# Patient Record
Sex: MALE | Race: WHITE | Employment: FULL TIME | URBAN - METROPOLITAN AREA
[De-identification: names, ages, dates, MRNs, and addresses within clinical notes are randomized per-mention and may not be internally consistent; named-entity substitution may affect disease eponyms.]

---

## 2019-02-07 ENCOUNTER — ANESTHESIA EVENT (OUTPATIENT)
Dept: SURGERY | Age: 59
End: 2019-02-07
Payer: COMMERCIAL

## 2019-02-08 ENCOUNTER — ANESTHESIA (OUTPATIENT)
Dept: SURGERY | Age: 59
End: 2019-02-08
Payer: COMMERCIAL

## 2019-02-08 ENCOUNTER — HOSPITAL ENCOUNTER (OUTPATIENT)
Age: 59
Discharge: HOME | End: 2019-02-08
Attending: PODIATRIST | Admitting: PODIATRIST
Payer: COMMERCIAL

## 2019-02-08 VITALS
BODY MASS INDEX: 21.76 KG/M2 | DIASTOLIC BLOOD PRESSURE: 90 MMHG | HEART RATE: 70 BPM | OXYGEN SATURATION: 94 % | RESPIRATION RATE: 15 BRPM | TEMPERATURE: 97.5 F | WEIGHT: 156 LBS | SYSTOLIC BLOOD PRESSURE: 141 MMHG

## 2019-02-08 PROBLEM — D48.5 NEOPLASM OF UNCERTAIN BEHAVIOR OF SKIN: Status: ACTIVE | Noted: 2019-02-08

## 2019-02-08 LAB
ALBUMIN SERPL-MCNC: 3.7 G/DL (ref 3.5–5)
ALBUMIN/GLOB SERPL: 1.2 {RATIO} (ref 1.2–3.5)
ALP SERPL-CCNC: 85 U/L (ref 50–136)
ALT SERPL-CCNC: 22 U/L (ref 12–65)
ANION GAP SERPL CALC-SCNC: 8 MMOL/L (ref 7–16)
AST SERPL-CCNC: 22 U/L (ref 15–37)
BILIRUB SERPL-MCNC: 0.4 MG/DL (ref 0.2–1.1)
BUN SERPL-MCNC: 6 MG/DL (ref 6–23)
CALCIUM SERPL-MCNC: 8.9 MG/DL (ref 8.3–10.4)
CHLORIDE SERPL-SCNC: 103 MMOL/L (ref 98–107)
CO2 SERPL-SCNC: 26 MMOL/L (ref 21–32)
CREAT SERPL-MCNC: 0.64 MG/DL (ref 0.8–1.5)
ERYTHROCYTE [DISTWIDTH] IN BLOOD BY AUTOMATED COUNT: 14.2 % (ref 11.9–14.6)
GLOBULIN SER CALC-MCNC: 3.1 G/DL (ref 2.3–3.5)
GLUCOSE SERPL-MCNC: 93 MG/DL (ref 65–100)
HCT VFR BLD AUTO: 39.7 % (ref 41.1–50.3)
HGB BLD-MCNC: 14.1 G/DL (ref 13.6–17.2)
MCH RBC QN AUTO: 36.9 PG (ref 26.1–32.9)
MCHC RBC AUTO-ENTMCNC: 35.5 G/DL (ref 31.4–35)
MCV RBC AUTO: 103.9 FL (ref 79.6–97.8)
NRBC # BLD: 0 K/UL (ref 0–0.2)
PLATELET # BLD AUTO: 239 K/UL (ref 150–450)
PMV BLD AUTO: 8.6 FL (ref 9.4–12.3)
POTASSIUM SERPL-SCNC: 4.2 MMOL/L (ref 3.5–5.1)
PROT SERPL-MCNC: 6.8 G/DL (ref 6.3–8.2)
RBC # BLD AUTO: 3.82 M/UL (ref 4.23–5.6)
SODIUM SERPL-SCNC: 137 MMOL/L (ref 136–145)
WBC # BLD AUTO: 5.2 K/UL (ref 4.3–11.1)

## 2019-02-08 PROCEDURE — 74011250636 HC RX REV CODE- 250/636: Performed by: ANESTHESIOLOGY

## 2019-02-08 PROCEDURE — 77030000032 HC CUF TRNQT ZIMM -B: Performed by: PODIATRIST

## 2019-02-08 PROCEDURE — 74011250636 HC RX REV CODE- 250/636

## 2019-02-08 PROCEDURE — 76210000020 HC REC RM PH II FIRST 0.5 HR: Performed by: PODIATRIST

## 2019-02-08 PROCEDURE — 76010000138 HC OR TIME 0.5 TO 1 HR: Performed by: PODIATRIST

## 2019-02-08 PROCEDURE — 74011250636 HC RX REV CODE- 250/636: Performed by: PODIATRIST

## 2019-02-08 PROCEDURE — 76060000032 HC ANESTHESIA 0.5 TO 1 HR: Performed by: PODIATRIST

## 2019-02-08 PROCEDURE — 77030018836 HC SOL IRR NACL ICUM -A: Performed by: PODIATRIST

## 2019-02-08 PROCEDURE — 74011000250 HC RX REV CODE- 250: Performed by: PODIATRIST

## 2019-02-08 PROCEDURE — 88305 TISSUE EXAM BY PATHOLOGIST: CPT

## 2019-02-08 PROCEDURE — 80053 COMPREHEN METABOLIC PANEL: CPT

## 2019-02-08 PROCEDURE — 76210000063 HC OR PH I REC FIRST 0.5 HR: Performed by: PODIATRIST

## 2019-02-08 PROCEDURE — 85027 COMPLETE CBC AUTOMATED: CPT

## 2019-02-08 PROCEDURE — 77030002916 HC SUT ETHLN J&J -A: Performed by: PODIATRIST

## 2019-02-08 PROCEDURE — 77030031139 HC SUT VCRL2 J&J -A: Performed by: PODIATRIST

## 2019-02-08 RX ORDER — PROMETHAZINE HYDROCHLORIDE 25 MG/1
25 TABLET ORAL
Qty: 30 TAB | Refills: 0 | Status: SHIPPED | OUTPATIENT
Start: 2019-02-08

## 2019-02-08 RX ORDER — LIDOCAINE HYDROCHLORIDE 10 MG/ML
0.3 INJECTION INFILTRATION; PERINEURAL ONCE
Status: DISCONTINUED | OUTPATIENT
Start: 2019-02-08 | End: 2019-02-08 | Stop reason: HOSPADM

## 2019-02-08 RX ORDER — DEXAMETHASONE SODIUM PHOSPHATE 100 MG/10ML
INJECTION INTRAMUSCULAR; INTRAVENOUS AS NEEDED
Status: DISCONTINUED | OUTPATIENT
Start: 2019-02-08 | End: 2019-02-08 | Stop reason: HOSPADM

## 2019-02-08 RX ORDER — SODIUM CHLORIDE, SODIUM LACTATE, POTASSIUM CHLORIDE, CALCIUM CHLORIDE 600; 310; 30; 20 MG/100ML; MG/100ML; MG/100ML; MG/100ML
100 INJECTION, SOLUTION INTRAVENOUS CONTINUOUS
Status: DISCONTINUED | OUTPATIENT
Start: 2019-02-08 | End: 2019-02-08 | Stop reason: HOSPADM

## 2019-02-08 RX ORDER — CEFAZOLIN SODIUM/WATER 2 G/20 ML
2 SYRINGE (ML) INTRAVENOUS ONCE
Status: COMPLETED | OUTPATIENT
Start: 2019-02-08 | End: 2019-02-08

## 2019-02-08 RX ORDER — BUPIVACAINE HYDROCHLORIDE 5 MG/ML
INJECTION, SOLUTION EPIDURAL; INTRACAUDAL AS NEEDED
Status: DISCONTINUED | OUTPATIENT
Start: 2019-02-08 | End: 2019-02-08 | Stop reason: HOSPADM

## 2019-02-08 RX ORDER — PROPOFOL 10 MG/ML
INJECTION, EMULSION INTRAVENOUS
Status: DISCONTINUED | OUTPATIENT
Start: 2019-02-08 | End: 2019-02-08 | Stop reason: HOSPADM

## 2019-02-08 RX ORDER — MIDAZOLAM HYDROCHLORIDE 1 MG/ML
2 INJECTION, SOLUTION INTRAMUSCULAR; INTRAVENOUS
Status: DISCONTINUED | OUTPATIENT
Start: 2019-02-08 | End: 2019-02-08 | Stop reason: HOSPADM

## 2019-02-08 RX ORDER — MIDAZOLAM HYDROCHLORIDE 1 MG/ML
INJECTION, SOLUTION INTRAMUSCULAR; INTRAVENOUS AS NEEDED
Status: DISCONTINUED | OUTPATIENT
Start: 2019-02-08 | End: 2019-02-08 | Stop reason: HOSPADM

## 2019-02-08 RX ORDER — SODIUM CHLORIDE 0.9 % (FLUSH) 0.9 %
5-40 SYRINGE (ML) INJECTION EVERY 8 HOURS
Status: DISCONTINUED | OUTPATIENT
Start: 2019-02-08 | End: 2019-02-08 | Stop reason: HOSPADM

## 2019-02-08 RX ORDER — HYDROMORPHONE HYDROCHLORIDE 2 MG/ML
0.5 INJECTION, SOLUTION INTRAMUSCULAR; INTRAVENOUS; SUBCUTANEOUS
Status: DISCONTINUED | OUTPATIENT
Start: 2019-02-08 | End: 2019-02-08 | Stop reason: HOSPADM

## 2019-02-08 RX ORDER — OXYCODONE HYDROCHLORIDE 5 MG/1
10 TABLET ORAL
Status: DISCONTINUED | OUTPATIENT
Start: 2019-02-08 | End: 2019-02-08 | Stop reason: HOSPADM

## 2019-02-08 RX ORDER — PROPOFOL 10 MG/ML
INJECTION, EMULSION INTRAVENOUS AS NEEDED
Status: DISCONTINUED | OUTPATIENT
Start: 2019-02-08 | End: 2019-02-08 | Stop reason: HOSPADM

## 2019-02-08 RX ORDER — SODIUM CHLORIDE 0.9 % (FLUSH) 0.9 %
5-40 SYRINGE (ML) INJECTION AS NEEDED
Status: DISCONTINUED | OUTPATIENT
Start: 2019-02-08 | End: 2019-02-08 | Stop reason: HOSPADM

## 2019-02-08 RX ORDER — LIDOCAINE HYDROCHLORIDE 20 MG/ML
INJECTION, SOLUTION EPIDURAL; INFILTRATION; INTRACAUDAL; PERINEURAL AS NEEDED
Status: DISCONTINUED | OUTPATIENT
Start: 2019-02-08 | End: 2019-02-08 | Stop reason: HOSPADM

## 2019-02-08 RX ORDER — LIDOCAINE HYDROCHLORIDE 10 MG/ML
INJECTION INFILTRATION; PERINEURAL AS NEEDED
Status: DISCONTINUED | OUTPATIENT
Start: 2019-02-08 | End: 2019-02-08 | Stop reason: HOSPADM

## 2019-02-08 RX ADMIN — LIDOCAINE HYDROCHLORIDE 70 MG: 20 INJECTION, SOLUTION EPIDURAL; INFILTRATION; INTRACAUDAL; PERINEURAL at 07:13

## 2019-02-08 RX ADMIN — Medication 2 G: at 07:13

## 2019-02-08 RX ADMIN — MIDAZOLAM HYDROCHLORIDE 2 MG: 1 INJECTION, SOLUTION INTRAMUSCULAR; INTRAVENOUS at 07:13

## 2019-02-08 RX ADMIN — SODIUM CHLORIDE, SODIUM LACTATE, POTASSIUM CHLORIDE, AND CALCIUM CHLORIDE: 600; 310; 30; 20 INJECTION, SOLUTION INTRAVENOUS at 07:13

## 2019-02-08 RX ADMIN — PROPOFOL 100 MCG/KG/MIN: 10 INJECTION, EMULSION INTRAVENOUS at 07:13

## 2019-02-08 RX ADMIN — PROPOFOL 50 MG: 10 INJECTION, EMULSION INTRAVENOUS at 07:13

## 2019-02-08 RX ADMIN — SODIUM CHLORIDE, SODIUM LACTATE, POTASSIUM CHLORIDE, AND CALCIUM CHLORIDE 100 ML/HR: 600; 310; 30; 20 INJECTION, SOLUTION INTRAVENOUS at 06:09

## 2019-02-08 NOTE — BRIEF OP NOTE
BRIEF OPERATIVE NOTE Date of Procedure: 2/8/2019 Preoperative Diagnosis: Neoplasm of uncertain behavior of skin of lower extremity [D48.5] Left foot pain [M79.672] Postoperative Diagnosis: Neoplasm of uncertain behavior of skin of lower extremity [D48.5] Left foot pain [M79.672] Procedure(s): EXCISION OF NEUROFIBROMA LEFT FOOT Surgeon(s) and Role: * Latoya Chen DPM - Primary Surgical Assistant:  
 
Surgical Staff: 
Circ-1: Snow Gutierrez RN Scrub Tech-1: Bonnie Ford Event Time In Time Out Incision Start 0730 Incision Close 1236 Anesthesia: MAC Estimated Blood Loss:  
Specimens:  
ID Type Source Tests Collected by Time Destination 1 : LEFT FOOT NEUROFIBROMA Preservative Foot, left  [place], [place] 2/8/2019 7040 Pathology Findings: dictated Complications: none Implants: * No implants in log *

## 2019-02-08 NOTE — ANESTHESIA PREPROCEDURE EVALUATION
Anesthetic History Review of Systems / Medical History Patient summary reviewed and pertinent labs reviewed Pulmonary Within defined limits Neuro/Psych Within defined limits Cardiovascular Hypertension Exercise tolerance: >4 METS 
  
GI/Hepatic/Renal 
Within defined limits Comments: H/o kidney stones Endo/Other Within defined limits Other Findings Physical Exam 
 
Airway Mallampati: II 
TM Distance: 4 - 6 cm Neck ROM: normal range of motion Mouth opening: Normal 
 
 Cardiovascular Rhythm: regular Rate: normal 
 
 
 
 Dental 
 
Dentition: Poor dentition Pulmonary Breath sounds clear to auscultation Abdominal 
 
 
 
 Other Findings Anesthetic Plan ASA: 2 Anesthesia type: total IV anesthesia Induction: Intravenous Anesthetic plan and risks discussed with: Patient and Spouse

## 2019-02-08 NOTE — H&P
Patient: Minor Ngo MRN: [id number]  SSN: [id number] YOB: 1960  Age: 66 y.o. Sex: male History and Physical 
 
Minor Ngo is a 66 y.o. male having Procedure(s): EXCISION OF NEUROFIBROMA LEFT FOOT. Allergies: No Known Allergies Chief Complaint: uncomfortable foot 8-10 months History of Present Illness: please see podiatry HPI Past Medical History:  
Diagnosis Date  Cancer ([address] 2011  
 basal-- left shoulder-- removed  History of kidney stones last one 2009  
 x 2  
 Hypertension   
 controlled with med  Left foot pain Past Surgical History:  
Procedure Laterality Date  HX KNEE ARTHROSCOPY Right 1980's  HX LITHOTRIPSY  2009  HX OTHER SURGICAL Left 2011  
 basal cell cancer removed from shoulder Family History Problem Relation Age of Onset  Cancer Mother  Heart Disease Father  Cancer Father Social History Tobacco Use  Smoking status: Current Every Day Smoker Packs/day: 0.25 Years: 18.00 Pack years: 4.50  Smokeless tobacco: Current User Substance Use Topics  Alcohol use: Yes Alcohol/week: 6.0 oz Types: 10 Glasses of wine per week Prior to Admission medications Medication Sig Start Date End Date Taking? Authorizing Provider  
lisinopril (PRINIVIL, ZESTRIL) 10 mg tablet Take 10 mg by mouth daily. Provider, Historical  
aspirin/salicylamide/caffeine (BC HEADACHE POWDER PO) Take  by mouth daily as needed. Stopped 2/8/19    Provider, Historical  
  
 
Visit Vitals /87 (BP 1 Location: Left arm, BP Patient Position: Sitting) Pulse 85 Temp 36.7 °C (98 °F) Resp 18 Wt 70.8 kg (156 lb) SpO2 95% BMI 21.76 kg/m² Assessment and Plan: Minor Ngo is a 66 y.o. male having Procedure(s): EXCISION OF NEUROFIBROMA LEFT FOOT. Preanesthesia Evaluation Last edited 02/08/19 0670 by Con Matos MD 
Date of Service 02/08/19 9938 Anesthetic History Review of Systems / Medical History Patient summary reviewed and pertinent labs reviewed Pulmonary Within defined limits Neuro/Psych Within defined limits Cardiovascular Hypertension Exercise tolerance: >4 METS 
  
GI/Hepatic/Renal 
Within defined limits Comments: H/o kidney stones Endo/Other Within defined limits Other Findings Physical Exam 
 
Airway Mallampati: II 
TM Distance: 4 - 6 cm Neck ROM: normal range of motion Mouth opening: Normal 
 
 Cardiovascular Rhythm: regular Rate: normal 
 
 
 
 Dental 
 
Dentition: Poor dentition Pulmonary Breath sounds clear to auscultation Abdominal 
 
 
 
 Other Findings Anesthetic Plan ASA: 2 Anesthesia type: total IV anesthesia Induction: Intravenous Anesthetic plan and risks discussed with: Patient and Spouse Preanesthesia evaluation performed by Justine Oropeza MD  
  
 
 
 
Signed By: Brianna Wylie MD   
 February 8, 2019

## 2019-02-08 NOTE — DISCHARGE INSTRUCTIONS
[facility]    Post operative instructions and recommendations for your personal comfort following foot surgery:    1. Upon arrival home, lie down and elevate your feet and legs approximately 16 on pillows. Also, support your knees with pillows. 2. Post-operative swelling is greatly reduced by the use of ice packs. Ice packs should be applied over the top or bottom of the bandage every twenty minutes on the hour until returning to the office. 3. Take medication prescribed by the doctor according to directions. Avoid taking medication on an empty stomach. 4. Remain quiet and off of your feet as much as possible for the first 48 hours. After this period use discretion and common sense regarding the amount of standing or walking you do. Generally, post-operative patients should stay off their feet for 24-48hours. After this period walking to tolerance is usually allowed (unless otherwise specified by your doctor). 5. Do not be alarmed if there is some slight bleeding on the bandages; good blood supply is essential for normal tissue healing. 6. Keep feet elevated as much as possible for the first three days. Generally above the chest is most desirable. 7. Keep bandages completely dry. 8. Do not remove the surgical bandages. 9. The successful result of your surgery depends on the careful following of these instructions. 10. Please refrain from all alcoholic beverages. 11. If there are any questions or problems, please feel free to phone the doctor at the office.         Devin Dubois, [address] [place] [address] [phone]  [address] [facility] [phone]    Dr. Giselle Boyer  Cell RKLAXN--[phone]    After general anesthesia or intravenous sedation, for 24 hours or while taking prescription Narcotics:  · Limit your activities  · Do not drive and operate hazardous machinery  · Do not make important personal or business decisions  · Do  not drink alcoholic beverages  · If you have not urinated within 8 hours after discharge, please contact your surgeon on call. *  Please give a list of your current medications to your Primary Care Provider. *  Please update this list whenever your medications are discontinued, doses are      changed, or new medications (including over-the-counter products) are added. *  Please carry medication information at all times in case of emergency situations. These are general instructions for a healthy lifestyle:  No smoking/ No tobacco products/ Avoid exposure to second hand smoke  Surgeon General's Warning:  Quitting smoking now greatly reduces serious risk to your health. Obesity, smoking, and sedentary lifestyle greatly increases your risk for illness  A healthy diet, regular physical exercise & weight monitoring are important for maintaining a healthy lifestyle    You may be retaining fluid if you have a history of heart failure or if you experience any of the following symptoms:  Weight gain of 3 pounds or more overnight or 5 pounds in a week, increased swelling in our hands or feet or shortness of breath while lying flat in bed. Please call your doctor as soon as you notice any of these symptoms; do not wait until your next office visit. Recognize signs and symptoms of STROKE:  F-face looks uneven  A-arms unable to move or move unevenly  S-speech slurred or non-existent  T-time-call 911 as soon as signs and symptoms begin-DO NOT go       Back to bed or wait to see if you get better-TIME IS BRAIN.

## 2019-02-08 NOTE — ANESTHESIA POSTPROCEDURE EVALUATION
Procedure(s): EXCISION OF NEUROFIBROMA LEFT FOOT. Anesthesia Post Evaluation Multimodal analgesia: multimodal analgesia used between 6 hours prior to anesthesia start to PACU discharge Patient location during evaluation: PACU Patient participation: complete - patient participated Level of consciousness: awake and alert Pain management: adequate Airway patency: patent Anesthetic complications: no 
Cardiovascular status: acceptable Respiratory status: acceptable Hydration status: acceptable Post anesthesia nausea and vomiting:  none Visit Vitals /90 (BP 1 Location: Right arm, BP Patient Position: At rest) Pulse 70 Temp 36.4 °C (97.5 °F) Resp 15 Wt 70.8 kg (156 lb) SpO2 94% BMI 21.76 kg/m²

## 2019-02-08 NOTE — OP NOTES
[facility]  OPERATIVE REPORT    Name:  Kristian Scott  MR#:  [id number]  :  1960  ACCOUNT #:  [de-identified]  DATE OF SERVICE:  2019    PREOPERATIVE DIAGNOSIS:  Soft tissue mass on the plantar aspect in the first  metatarsophalangeal joint area proximal to the weightbearing surface of the left  foot. POSTOPERATIVE DIAGNOSIS:  Soft tissue mass on the plantar aspect in the first  metatarsophalangeal joint area proximal to the weightbearing surface of the left  foot. PROCEDURE PERFORMED:  Excision of the lesion with a plastic closure. SURGEON:  Dc Chen DPM.    ASSISTANT:  _____    ANESTHESIA:  IV sedation with local infiltration. COMPLICATIONS:  _____. SPECIMENS REMOVED:  _____. IMPLANTS:  _____. ESTIMATED BLOOD LOSS:  _____. PROCEDURE IN DETAIL:  The patient was brought to the operating room and placed on  operating table in supine position. After a brief general anesthesia, local  anesthesia was achieved utilizing 1:1 mixture of 0.5% Marcaine and 1% Xylocaine,  utilizing a total of approximately 9-10 mL. At this time, the left foot was prepped  and draped in the usual standard fashion. The foot was exsanguinated and tourniquet  was elevated at the ankle level at 250 mmHg. Attention was then directed to the  plantar aspect of the left foot, where the lesion was clearly visible. It was  somewhat pedunculated. At this time, an incision was made. The entire area was  ellipsed. The entire lesion was removed from the surgical site in toto including the  overlying skin. The surgical site was irrigated. At this time, the skin was freed  from the surrounding soft tissues to be able to achieve a closure. After mobilizing  the skin, skin was easily coaptable. The entire wound was closed with multiple  simple interrupted and horizontal and vertical mattress sutures.   Postoperatively,  the surgical area was injected with 4 mL of 0.5% Marcaine mixed with 4 mg of  dexamethasone. As soon as the tourniquet was released, instant warming and pinking  of the digits were noted indicating good return of the vascular supply. The wound  was dressed with Betadine soaked Adaptic, plain 4 x 4 Kerlix, and Coban. The patient  tolerated the anesthesia and procedure well and left the operating room in very  satisfactory condition. Postoperatively, he has been advised to walk on his heels and not to put any weight  in the front of the foot. I have advised his wife of this also. He already has his  postop medications. I have given him prescription for Phenergan 25 mg one every 6  hours as needed for nausea. Per his request, this specimen has been sent for  pathological examination. He is to come and see me in approximately 5 to 7 days for  followup.         LAURA [email or web address]  D:  02/08/2019 8:09  T:  02/08/2019 14:31  JOB #:  [id number]  CC:  Lizzie Gamble DPM

## 2019-03-13 NOTE — OP NOTES
[address] [facility]  OPERATIVE REPORT    Name:  Arnoldo Willis  MR#:  [id number]  :  1960  ACCOUNT #:  [de-identified]  DATE OF SERVICE:  2019    PREOPERATIVE DIAGNOSIS:  Soft tissue mass plantar aspect proximal to the first metatarsophalangeal area of the left foot. POSTOPERATIVE DIAGNOSIS:  Soft tissue mass plantar aspect proximal to the first metatarsophalangeal area of the left foot. PROCEDURE PERFORMED:  Excision of the soft tissue mass. SURGEON:  Dc Chen DPM    ASSISTANT:  none. ANESTHESIA:  IV sedation with local infiltration. COMPLICATIONS:  none. SPECIMENS REMOVED:  Sent for path. IMPLANTS:  none. ESTIMATED BLOOD LOSS:  minimal.    PROCEDURE:  Patient was brought to the operating room and placed on the operating table in supine position. After a brief general anesthesia, local anesthesia was achieved utilizing 1:1 mixture of 0.5% Marcaine and 1% Xylocaine plain, utilizing approximately 10 mL. At this time, the foot was prepped and draped in the usual standard fashion. The foot was exsanguinated and tourniquet was elevated at the ankle level at 250 mmHg. Attention was then directed to the soft tissue enlargement on the plantar aspect. At this time, utilizing a linear and a somewhat of an elliptical incision the entire lesion was removed from the surgical site in toto. It was mainly noted to be in the skin and the soft tissue and the fatty layer. Beyond that, it was noted to be quite normal.  No visible nerves or vasculatures were noted. No other gross abnormalities were noted. The surgical site was irrigated with copious amounts of sterile saline. At this time, the skin needed to be freed somewhat to reduce the tension from the surgical site, which was done. The deep closure was achieved utilizing two or three 3-0 Vicryl and skin was closed utilizing 3-0 nylon with multiple interrupted and horizontal mattress sutures.   Postoperatively, the surgical area was injected with 4 mL of 0.5% Marcaine mixed with 2 mg of dexamethasone for prolonged anesthesia. As soon as the tourniquet was released, instant warming and pinking of the digits were noted. The wound was dressed with Betadine-soaked Adaptic, plain 4 x 4, Kerlix, and Coban. The patient tolerated the anesthesia and procedure well and left the operating room in apparent satisfactory condition. Postoperatively, the specimen has been sent for pathological examination. He already has his postop medications. I have given him prescription for Phenergan for nausea, 25 mg, #30, one every 6 hours as needed. His prognosis remains quite guarded due to him being a smoker. I have repeatedly advised him not to smoke. He also has been strongly advised not to walk on the front of the foot, though he can ambulate on the heel. He is to come and see me in approximately 5 to 7 days for followup.       LAURA Enciso/[email or web address]/  D:  03/13/2019 7:50  T:  03/13/2019 11:20  JOB #:  [id number]

## (undated) DEVICE — SKIN MARKER FINE TIP: Brand: DEVON

## (undated) DEVICE — BANDAGE COMPR SELF ADH 5 YDX4 IN TAN STRL PREMIERPRO LF

## (undated) DEVICE — AMD ANTIMICROBIAL GAUZE SPONGES,12 PLY USP TYPE VII, 0.2% POLYHEXAMETHYLENE BIGUANIDE HCI (PHMB): Brand: CURITY

## (undated) DEVICE — STOCKINETTE: Brand: DEROYAL

## (undated) DEVICE — SUTURE COAT VCRL SZ 4-0 L18IN ABSRB UD L19MM PS-2 1/2 CIR J496G

## (undated) DEVICE — REM POLYHESIVE ADULT PATIENT RETURN ELECTRODE: Brand: VALLEYLAB

## (undated) DEVICE — SUTURE VCRL SZ 3-0 L18IN ABSRB UD PS-2 L19MM 1/2 CIR J497G

## (undated) DEVICE — KERLIX BANDAGE ROLL: Brand: KERLIX

## (undated) DEVICE — INTENDED FOR TISSUE SEPARATION, AND OTHER PROCEDURES THAT REQUIRE A SHARP SURGICAL BLADE TO PUNCTURE OR CUT.: Brand: BARD-PARKER SAFETY BLADES SIZE 15, STERILE

## (undated) DEVICE — BUTTON SWITCH PENCIL BLADE ELECTRODE, HOLSTER: Brand: EDGE

## (undated) DEVICE — SYR 10ML LUER LOK 1/5ML GRAD --

## (undated) DEVICE — Device

## (undated) DEVICE — ZIMMER® STERILE DISPOSABLE TOURNIQUET CUFF, DUAL PORT, SINGLE BLADDER, 12 IN. (30 CM)

## (undated) DEVICE — AMD ANTIMICROBIAL BANDAGE ROLL,6 PLY: Brand: KERLIX

## (undated) DEVICE — 1010 S-DRAPE TOWEL DRAPE 10/BX: Brand: STERI-DRAPE™

## (undated) DEVICE — SOLUTION IV 1000ML 0.9% SOD CHL

## (undated) DEVICE — 2000CC GUARDIAN II: Brand: GUARDIAN

## (undated) DEVICE — SUT ETHLN 3-0 18IN PS2 BLK --